# Patient Record
(demographics unavailable — no encounter records)

---

## 2024-10-18 NOTE — ASSESSMENT
[FreeTextEntry1] : MAY ANTOINE is a 78 year old female with a PMH significant for Cirrhosis, DM, HLD, HTN, CAD s/p stents, Peptic Ulcers, Osteoarthritis, Asthma and Psoriasis.   Cirrhosis -Etiology [facility] -Disease progression of cirrhosis discussed, including but not limited to hepatocellular carcinoma, varices with or without bleeding, hepatic encephalopathy, ascites, liver failure and death.   HCC Screening -I have explained the need for imaging every 6 months to assess for HCC. -US Abdo 5/2024 - no focal lesion. AFP nl.   Ascites -euvolemic, was previously on Torsemide by cardiology but no longer taking. -Pt was counseled to adhere to a low sodium (<2 grams of sodium per day) diet by - avoiding adding any salt to meals (removing the saltshaker from the table); eliminating salty foods from diet; eating more home-cooked meals; choosing fresh or frozen, not canned, vegetables and fruits; and reading ingredient and food labels to choose low sodium foods.   Variceal Screening -EGD 8/3/22 - no varices, non-bleeding gastric ulcers -if pt experiences hematemesis, advised to go to ER immediately   Encephalopathy -notify provider if new onset confusion -at least 1-2 bms/day   Health Maintenance -Can take up to 2G Tylenol per day. NO NSAIDs as these can lead to diuretic resistance and precipitate renal dysfunction in patients with advanced liver disease. -High Protein Low Fat Low Salt (up to 2G Na/day) Diet, no prolonged fasting, Mediterranean Diet info provided -Continue to abstain from alcohol and all illicit drugs -Avoid use of herbal and dietary supplements due to potential hepatotoxicity -Avoid eating any unpasteurized dairy products; avoid eating any raw or undercooked eggs, fish, poultry, or meat; and avoid eating raw/steamed oysters or other shellfish to avoid risk of infection.   Follow up in 6 months w/labs and imaging

## 2024-10-18 NOTE — PHYSICAL EXAM
[Scleral Icterus] : Scleral Icterus noted [Spider Angioma] : Spider angioma(s) was(were) observed [Non-Tender] : non-tender [Smooth] : smooth [General Appearance - Alert] : alert [General Appearance - In No Acute Distress] : in no acute distress [Outer Ear] : the ears and nose were normal in appearance [Oropharynx] : the oropharynx was normal [Neck Appearance] : the appearance of the neck was normal [Auscultation Breath Sounds / Voice Sounds] : lungs were clear to auscultation bilaterally [Heart Rate And Rhythm] : heart rate was normal and rhythm regular [___ +] : bilateral [unfilled]+ pitting edema to the ankles [Bowel Sounds] : normal bowel sounds [Abdomen Soft] : soft [Abdomen Tenderness] : non-tender [Abdomen Mass (___ Cm)] : no abdominal mass palpated [Abnormal Walk] : normal gait [Nail Clubbing] : no clubbing  or cyanosis of the fingernails [Musculoskeletal - Swelling] : no joint swelling seen [Motor Tone] : muscle strength and tone were normal [Skin Color & Pigmentation] : normal skin color and pigmentation [Skin Turgor] : normal skin turgor [] : no rash [No Focal Deficits] : no focal deficits [Oriented To Time, Place, And Person] : oriented to person, place, and time [Impaired Insight] : insight and judgment were intact [Affect] : the affect was normal [Hepatojugular Reflux] : patient did not have a sustained hepatojugular reflux [Abdominal  Ascites] : no ascites [Splenomegaly] : no splenomegaly [Asterixis] : no asterixis observed [Jaundice] : No jaundice [Palmar Erythema] : no Palmar Erythema [Depression] : no depression [Hallucinations] : ~T no ~M hallucinations

## 2024-10-18 NOTE — HISTORY OF PRESENT ILLNESS
[de-identified] : MAY ANTOINE is a 78 year old female with a PMH significant for Cirrhosis, DM, HLD, HTN, CAD s/p stents, Peptic Ulcers, Osteoarthritis, Asthma and Psoriasis.  10/16/24: f/u visit, accompanied by her daughter. JARAD since last visit. Previsit labs reviewed w/pt. LFTs stable. Serum crt still elevated, pt pending nephrology consult. She is due for her 6 month hcc screening. Denies abdominal pain, jaundice, hematemesis, black stools or unexplained weight loss. She reports her pcp is considering started a GLP1, ok from a hepatology standpoint.  2/20/24 Presents for 6 monthly follow-up. Records reviewed, including notes, labs, imaging, etc. No new liver-related complaints like hematemesis melena jaundice. No hospitalizations or medication changes. Just some topical creams for psoriasis have been started. Previsit labs reviewed. LFTs at baseline. Creatinine has been at 1.4 since January of last year.  8/15/2023 Presents for 6 monthly follow-up. 6 monthly labs were not done. And went over the etiopathogenesis and management of cirrhosis with her in detail.  2/1/23: Pt is accompanied with her daughter and granddaughter. Pt reports she is feeling well. Denies confusion, hematemesis, abdominal pain or distension, hematochezia. She does report mild pruritus. Labs 1/25/23 - bilirubin 1.9 (from 1.6), AST 18, ALT 16, , Creatinine 1.29 (from 1.34). Since last visit, she has discontinued Metformin and Torsemide. She reports she discussed switching from Lisinopril to alternative agent but PCP recommended continuing this medication. She had regular BMs daily. Weight is stable.  8/30/22: Pt presents after [facility] admission for hematemesis and urosepsis. EGD done during admission revealed peptic ulcers, but no varices. She was treated for the UTI with ceftriaxone, vancomycin then rocephin and flagyl.  Pt was informed of cirrhosis found on imaging during this hospital admission. She states she was previously told she had fatty liver. BMI is 38. Denies excessive alcohol consumption. Denies OTC or herbal supplements. Pt reports she was confused in the hospital but since completing antibiotics, pt denies confusion. She has 1-2 bms per day. Pt takes Torsemide 20 mg PRN for pedal edema. Denies fatigue, malaise, arthralgias, myalgias, pruritus, abdominal pain or distension, jaundice, hematochezia, dark urine, unintentional weight loss or gain. Denies family history of liver disease, sudden death or late trimester miscarriages.  Labs 8/6/22 - bilirubin 1.3, AST 32, ALT 39, AlkPhos 117, INR 1.32 8/16/22 - bilirubin 1.9, AST 24, ALT 15, AlkPhos 93, iron studies negative  MR ABDOMEN WAW IC 08/04/22 - Hepatosplenomegaly with hepatic steatosis. US DPLX ABDOMEN 08/03/22 - Patent main portal vein with normal flow direction. CT ABDOMEN AND PELVIS 08/02/22 - Cirrhotic liver with portal hypertension, splenomegaly, and a spontaneous splenorenal shunt.  EGD 8/3/22 - no varices, peptic ulcers

## 2024-10-18 NOTE — ASSESSMENT
[FreeTextEntry1] : MAY ANTOINE is a 78 year old female with a PMH significant for Cirrhosis, DM, HLD, HTN, CAD s/p stents, Peptic Ulcers, Osteoarthritis, Asthma and Psoriasis.   Cirrhosis -Etiology [facility] -Disease progression of cirrhosis discussed, including but not limited to hepatocellular carcinoma, varices with or without bleeding, hepatic encephalopathy, ascites, liver failure and death.   HCC Screening -I have explained the need for imaging every 6 months to assess for HCC. -US Abdo 5/2024 - no focal lesion. AFP nl.   Ascites -euvolemic, was previously on Torsemide by cardiology but no longer taking. -Pt was counseled to adhere to a low sodium (<2 grams of sodium per day) diet by - avoiding adding any salt to meals (removing the saltshaker from the table); eliminating salty foods from diet; eating more home-cooked meals; choosing fresh or frozen, not canned, vegetables and fruits; and reading ingredient and food labels to choose low sodium foods.   Variceal Screening -EGD 8/3/22 - no varices, non-bleeding gastric ulcers -if pt experiences hematemesis, advised to go to ER immediately   Encephalopathy -notify provider if new onset confusion -at least 1-2 bms/day   Health Maintenance -Can take up to 2G Tylenol per day. NO NSAIDs as these can lead to diuretic resistance and precipitate renal dysfunction in patients with advanced liver disease. -High Protein Low Fat Low Salt (up to 2G Na/day) Diet, no prolonged fasting, Mediterranean Diet info provided -Continue to abstain from alcohol and all illicit drugs -Avoid use of herbal and dietary supplements due to potential hepatotoxicity -Avoid eating any unpasteurized dairy products; avoid eating any raw or undercooked eggs, fish, poultry, or meat; and avoid eating raw/steamed oysters or other shellfish to avoid risk of infection.   Follow up in 6 months w/labs and imaging

## 2024-10-18 NOTE — HISTORY OF PRESENT ILLNESS
[de-identified] : MAY ANTOINE is a 78 year old female with a PMH significant for Cirrhosis, DM, HLD, HTN, CAD s/p stents, Peptic Ulcers, Osteoarthritis, Asthma and Psoriasis.  10/16/24: f/u visit, accompanied by her daughter. JARAD since last visit. Previsit labs reviewed w/pt. LFTs stable. Serum crt still elevated, pt pending nephrology consult. She is due for her 6 month hcc screening. Denies abdominal pain, jaundice, hematemesis, black stools or unexplained weight loss. She reports her pcp is considering started a GLP1, ok from a hepatology standpoint.  2/20/24 Presents for 6 monthly follow-up. Records reviewed, including notes, labs, imaging, etc. No new liver-related complaints like hematemesis melena jaundice. No hospitalizations or medication changes. Just some topical creams for psoriasis have been started. Previsit labs reviewed. LFTs at baseline. Creatinine has been at 1.4 since January of last year.  8/15/2023 Presents for 6 monthly follow-up. 6 monthly labs were not done. And went over the etiopathogenesis and management of cirrhosis with her in detail.  2/1/23: Pt is accompanied with her daughter and granddaughter. Pt reports she is feeling well. Denies confusion, hematemesis, abdominal pain or distension, hematochezia. She does report mild pruritus. Labs 1/25/23 - bilirubin 1.9 (from 1.6), AST 18, ALT 16, , Creatinine 1.29 (from 1.34). Since last visit, she has discontinued Metformin and Torsemide. She reports she discussed switching from Lisinopril to alternative agent but PCP recommended continuing this medication. She had regular BMs daily. Weight is stable.  8/30/22: Pt presents after [facility] admission for hematemesis and urosepsis. EGD done during admission revealed peptic ulcers, but no varices. She was treated for the UTI with ceftriaxone, vancomycin then rocephin and flagyl.  Pt was informed of cirrhosis found on imaging during this hospital admission. She states she was previously told she had fatty liver. BMI is 38. Denies excessive alcohol consumption. Denies OTC or herbal supplements. Pt reports she was confused in the hospital but since completing antibiotics, pt denies confusion. She has 1-2 bms per day. Pt takes Torsemide 20 mg PRN for pedal edema. Denies fatigue, malaise, arthralgias, myalgias, pruritus, abdominal pain or distension, jaundice, hematochezia, dark urine, unintentional weight loss or gain. Denies family history of liver disease, sudden death or late trimester miscarriages.  Labs 8/6/22 - bilirubin 1.3, AST 32, ALT 39, AlkPhos 117, INR 1.32 8/16/22 - bilirubin 1.9, AST 24, ALT 15, AlkPhos 93, iron studies negative  MR ABDOMEN WAW IC 08/04/22 - Hepatosplenomegaly with hepatic steatosis. US DPLX ABDOMEN 08/03/22 - Patent main portal vein with normal flow direction. CT ABDOMEN AND PELVIS 08/02/22 - Cirrhotic liver with portal hypertension, splenomegaly, and a spontaneous splenorenal shunt.  EGD 8/3/22 - no varices, peptic ulcers

## 2025-03-13 NOTE — ASSESSMENT
[FreeTextEntry1] : MAY ANTOINE is a 78 year old female with a PMH significant for Cirrhosis, DM, HLD, HTN, CAD s/p stents, Peptic Ulcers, Osteoarthritis, Asthma and Psoriasis.   Cirrhosis -Etiology [facility] -Disease progression of cirrhosis discussed, including but not limited to hepatocellular carcinoma, varices with or without bleeding, hepatic encephalopathy, ascites, liver failure and death.   HCC Screening -I have explained the need for imaging every 6 months to assess for HCC. -US Abdo 11/2024 - no focal lesion. AFP nl.   Ascites -euvolemic, was previously on Torsemide by cardiology but no longer taking. -Pt was counseled to adhere to a low sodium (<2 grams of sodium per day) diet by - avoiding adding any salt to meals (removing the saltshaker from the table); eliminating salty foods from diet; eating more home-cooked meals; choosing fresh or frozen, not canned, vegetables and fruits; and reading ingredient and food labels to choose low sodium foods.   Variceal Screening -EGD 8/3/22 - no varices, non-bleeding gastric ulcers -if pt experiences hematemesis, advised to go to ER immediately   Encephalopathy -notify provider if new onset confusion -at least 1-2 bms/day   Health Maintenance -Can take up to 2G Tylenol per day. NO NSAIDs as these can lead to diuretic resistance and precipitate renal dysfunction in patients with advanced liver disease. -High Protein Low Fat Low Salt (up to 2G Na/day) Diet, no prolonged fasting, Mediterranean Diet info provided -Continue to abstain from alcohol and all illicit drugs -Avoid use of herbal and dietary supplements due to potential hepatotoxicity -Avoid eating any unpasteurized dairy products; avoid eating any raw or undercooked eggs, fish, poultry, or meat; and avoid eating raw/steamed oysters or other shellfish to avoid risk of infection.   Follow up in 6 months w/labs and imaging

## 2025-03-13 NOTE — ASSESSMENT
[FreeTextEntry1] : MAY ANTOINE is a 78 year old female with a PMH significant for Cirrhosis, DM, HLD, HTN, CAD s/p stents, Peptic Ulcers, Osteoarthritis, Asthma and Psoriasis.   Cirrhosis -Etiology [facility] -Disease progression of cirrhosis discussed, including but not limited to hepatocellular carcinoma, varices with or without bleeding, hepatic encephalopathy, ascites, liver failure and death.   HCC Screening -I have explained the need for imaging every 6 months to assess for HCC. -US Abdo 11/2024 - no focal lesion. AFP nl.   Ascites -euvolemic, was previously on Torsemide by cardiology but no longer taking. -Pt was counseled to adhere to a low sodium (<2 grams of sodium per day) diet by - avoiding adding any salt to meals (removing the saltshaker from the table); eliminating salty foods from diet; eating more home-cooked meals; choosing fresh or frozen, not canned, vegetables and fruits; and reading ingredient and food labels to choose low sodium foods.   Variceal Screening -EGD 8/3/22 - no varices, non-bleeding gastric ulcers -if pt experiences hematemesis, advised to go to ER immediately   Encephalopathy -notify provider if new onset confusion -at least 1-2 bms/day   Health Maintenance -Can take up to 2G Tylenol per day. NO NSAIDs as these can lead to diuretic resistance and precipitate renal dysfunction in patients with advanced liver disease. -High Protein Low Fat Low Salt (up to 2G Na/day) Diet, no prolonged fasting, Mediterranean Diet info provided -Continue to abstain from alcohol and all illicit drugs -Avoid use of herbal and dietary supplements due to potential hepatotoxicity -Avoid eating any unpasteurized dairy products; avoid eating any raw or undercooked eggs, fish, poultry, or meat; and avoid eating raw/steamed oysters or other shellfish to avoid risk of infection.   Follow up in 6 months w/labs and imaging

## 2025-03-13 NOTE — HISTORY OF PRESENT ILLNESS
[FreeTextEntry1] : MAY ANTOINE is a 78 year old female with a PMH significant for Cirrhosis, DM, HLD, HTN, CAD s/p stents, Peptic Ulcers, Osteoarthritis, Asthma and Psoriasis.  3/11/2025 Records reviewed, including notes, labs, imaging, etc. No new liver-related complaints like hematemesis melena jaundice, confusion. No hospitalizations or medication changes. Previsit labs reviewed with the patient.  H&H stable.  Her creatinine is stable at 1.4, bilirubin stable at 2.0 mostly indirect with the direct portion being 0.4 AFP less than 1.8 Ultrasound November 5, 2024 no focal lesion.  Patient has lost around 10 pounds  10/16/24: f/u visit, accompanied by her daughter. JARAD since last visit. Previsit labs reviewed w/pt. LFTs stable. Serum crt still elevated, pt pending nephrology consult. She is due for her 6 month hcc screening. Denies abdominal pain, jaundice, hematemesis, black stools or unexplained weight loss. She reports her pcp is considering started a GLP1, ok from a hepatology standpoint.  2/20/24 Presents for 6 monthly follow-up. Records reviewed, including notes, labs, imaging, etc. No new liver-related complaints like hematemesis melena jaundice. No hospitalizations or medication changes. Just some topical creams for psoriasis have been started. Previsit labs reviewed. LFTs at baseline. Creatinine has been at 1.4 since January of last year.  8/15/2023 Presents for 6 monthly follow-up. 6 monthly labs were not done. And went over the etiopathogenesis and management of cirrhosis with her in detail.  2/1/23: Pt is accompanied with her daughter and granddaughter. Pt reports she is feeling well. Denies confusion, hematemesis, abdominal pain or distension, hematochezia. She does report mild pruritus. Labs 1/25/23 - bilirubin 1.9 (from 1.6), AST 18, ALT 16, , Creatinine 1.29 (from 1.34). Since last visit, she has discontinued Metformin and Torsemide. She reports she discussed switching from Lisinopril to alternative agent but PCP recommended continuing this medication. She had regular BMs daily. Weight is stable.  8/30/22: Pt presents after [facility] admission for hematemesis and urosepsis. EGD done during admission revealed peptic ulcers, but no varices. She was treated for the UTI with ceftriaxone, vancomycin then rocephin and flagyl.  Pt was informed of cirrhosis found on imaging during this hospital admission. She states she was previously told she had fatty liver. BMI is 38. Denies excessive alcohol consumption. Denies OTC or herbal supplements. Pt reports she was confused in the hospital but since completing antibiotics, pt denies confusion. She has 1-2 bms per day. Pt takes Torsemide 20 mg PRN for pedal edema. Denies fatigue, malaise, arthralgias, myalgias, pruritus, abdominal pain or distension, jaundice, hematochezia, dark urine, unintentional weight loss or gain. Denies family history of liver disease, sudden death or late trimester miscarriages.  Labs 8/6/22 - bilirubin 1.3, AST 32, ALT 39, AlkPhos 117, INR 1.32 8/16/22 - bilirubin 1.9, AST 24, ALT 15, AlkPhos 93, iron studies negative  MR ABDOMEN WAW IC 08/04/22 - Hepatosplenomegaly with hepatic steatosis. US DPLX ABDOMEN 08/03/22 - Patent main portal vein with normal flow direction. CT ABDOMEN AND PELVIS 08/02/22 - Cirrhotic liver with portal hypertension, splenomegaly, and a spontaneous splenorenal shunt.  EGD 8/3/22 - no varices, peptic ulcers

## 2025-03-13 NOTE — HISTORY OF PRESENT ILLNESS
[FreeTextEntry1] : MAY ANTOINE is a 78 year old female with a PMH significant for Cirrhosis, DM, HLD, HTN, CAD s/p stents, Peptic Ulcers, Osteoarthritis, Asthma and Psoriasis.  3/11/2025 Records reviewed, including notes, labs, imaging, etc. No new liver-related complaints like hematemesis melena jaundice, confusion. No hospitalizations or medication changes. Previsit labs reviewed with the patient.  H&H stable.  Her creatinine is stable at 1.4, bilirubin stable at 2.0 mostly indirect with the direct portion being 0.4 AFP less than 1.8 Ultrasound November 5, 2024 no focal lesion.  Patient has lost around 10 pounds  10/16/24: f/u visit, accompanied by her daughter. JARAD since last visit. Previsit labs reviewed w/pt. LFTs stable. Serum crt still elevated, pt pending nephrology consult. She is due for her 6 month hcc screening. Denies abdominal pain, jaundice, hematemesis, black stools or unexplained weight loss. She reports her pcp is considering started a GLP1, ok from a hepatology standpoint.  2/20/24 Presents for 6 monthly follow-up. Records reviewed, including notes, labs, imaging, etc. No new liver-related complaints like hematemesis melena jaundice. No hospitalizations or medication changes. Just some topical creams for psoriasis have been started. Previsit labs reviewed. LFTs at baseline. Creatinine has been at 1.4 since January of last year.  8/15/2023 Presents for 6 monthly follow-up. 6 monthly labs were not done. And went over the etiopathogenesis and management of cirrhosis with her in detail.  2/1/23: Pt is accompanied with her daughter and granddaughter. Pt reports she is feeling well. Denies confusion, hematemesis, abdominal pain or distension, hematochezia. She does report mild pruritus. Labs 1/25/23 - bilirubin 1.9 (from 1.6), AST 18, ALT 16, , Creatinine 1.29 (from 1.34). Since last visit, she has discontinued Metformin and Torsemide. She reports she discussed switching from Lisinopril to alternative agent but PCP recommended continuing this medication. She had regular BMs daily. Weight is stable.  8/30/22: Pt presents after [facility] admission for hematemesis and urosepsis. EGD done during admission revealed peptic ulcers, but no varices. She was treated for the UTI with ceftriaxone, vancomycin then rocephin and flagyl.  Pt was informed of cirrhosis found on imaging during this hospital admission. She states she was previously told she had fatty liver. BMI is 38. Denies excessive alcohol consumption. Denies OTC or herbal supplements. Pt reports she was confused in the hospital but since completing antibiotics, pt denies confusion. She has 1-2 bms per day. Pt takes Torsemide 20 mg PRN for pedal edema. Denies fatigue, malaise, arthralgias, myalgias, pruritus, abdominal pain or distension, jaundice, hematochezia, dark urine, unintentional weight loss or gain. Denies family history of liver disease, sudden death or late trimester miscarriages.  Labs 8/6/22 - bilirubin 1.3, AST 32, ALT 39, AlkPhos 117, INR 1.32 8/16/22 - bilirubin 1.9, AST 24, ALT 15, AlkPhos 93, iron studies negative  MR ABDOMEN WAW IC 08/04/22 - Hepatosplenomegaly with hepatic steatosis. US DPLX ABDOMEN 08/03/22 - Patent main portal vein with normal flow direction. CT ABDOMEN AND PELVIS 08/02/22 - Cirrhotic liver with portal hypertension, splenomegaly, and a spontaneous splenorenal shunt.  EGD 8/3/22 - no varices, peptic ulcers

## 2025-04-10 NOTE — ASSESSMENT
[FreeTextEntry1] : 77 yo f referred for CKD Pts history and HH hospitalizations reviewed on [place] Creat 1.49 1st noted in 2017 Pt had multiple evaluations for R knee ortho issues for which chronic NSAID use was documented + h/o DM, w peripheral neuropathy, Obesity, CAD with intervention During an admission for GI bleed pt was noted to have cirrhosis on abd imaging and was referred for evaluation Creat levels have been varying with pts illnesses and hydration/ azotemia  4/10 fu lost 10 lbs 255 down to 245 lbs no complaints

## 2025-04-10 NOTE — HISTORY OF PRESENT ILLNESS
[FreeTextEntry1] : 77 yo f referred for CKD Pts history and HH hospitalizations reviewed on [place] Creat 1.49 1st noted in 2017 Pt had multiple evaluations for R knee ortho issues for which chronic NSAID use was documented + h/o DM, w peripheral neuropathy, Obesity, CAD with intervention During an admission for GI bleed pt was noted to have cirrhosis on abd imaging and was referred for evaluation Creat levels have been varying with pts illnesses and hydration/ azotemia  4/10 fu lost 10 lbs 255 down to 245 lbs no complaints

## 2025-04-10 NOTE — ASSESSMENT
[FreeTextEntry1] : 79 yo f referred for CKD Pts history and HH hospitalizations reviewed on [place] Creat 1.49 1st noted in 2017 Pt had multiple evaluations for R knee ortho issues for which chronic NSAID use was documented + h/o DM, w peripheral neuropathy, Obesity, CAD with intervention During an admission for GI bleed pt was noted to have cirrhosis on abd imaging and was referred for evaluation Creat levels have been varying with pts illnesses and hydration/ azotemia  4/10 fu lost 10 lbs 255 down to 245 lbs no complaints

## 2025-04-10 NOTE — HISTORY OF PRESENT ILLNESS
[FreeTextEntry1] : 79 yo f referred for CKD Pts history and HH hospitalizations reviewed on [place] Creat 1.49 1st noted in 2017 Pt had multiple evaluations for R knee ortho issues for which chronic NSAID use was documented + h/o DM, w peripheral neuropathy, Obesity, CAD with intervention During an admission for GI bleed pt was noted to have cirrhosis on abd imaging and was referred for evaluation Creat levels have been varying with pts illnesses and hydration/ azotemia  4/10 fu lost 10 lbs 255 down to 245 lbs no complaints